# Patient Record
Sex: FEMALE | Race: BLACK OR AFRICAN AMERICAN | URBAN - METROPOLITAN AREA
[De-identification: names, ages, dates, MRNs, and addresses within clinical notes are randomized per-mention and may not be internally consistent; named-entity substitution may affect disease eponyms.]

---

## 2017-05-10 ENCOUNTER — OFFICE (OUTPATIENT)
Dept: URBAN - METROPOLITAN AREA CLINIC 78 | Facility: CLINIC | Age: 52
End: 2017-05-10

## 2017-05-10 VITALS
WEIGHT: 208 LBS | TEMPERATURE: 98 F | SYSTOLIC BLOOD PRESSURE: 107 MMHG | HEART RATE: 78 BPM | DIASTOLIC BLOOD PRESSURE: 64 MMHG | HEIGHT: 66 IN

## 2017-05-10 DIAGNOSIS — R93.5 ABNORMAL FINDINGS ON DIAGNOSTIC IMAGING OF OTHER ABDOMINAL R: ICD-10-CM

## 2017-05-10 DIAGNOSIS — K92.2 GASTROINTESTINAL HEMORRHAGE, UNSPECIFIED: ICD-10-CM

## 2017-05-10 DIAGNOSIS — R19.7 DIARRHEA, UNSPECIFIED: ICD-10-CM

## 2017-05-10 PROCEDURE — 99244 OFF/OP CNSLTJ NEW/EST MOD 40: CPT

## 2017-05-10 RX ORDER — CIPROFLOXACIN 500 MG/1
TABLET, FILM COATED ORAL
Qty: 28 | Refills: 0 | Status: COMPLETED
Start: 2017-05-10 | End: 2017-07-17

## 2017-05-10 RX ORDER — METRONIDAZOLE 500 MG/1
TABLET, FILM COATED ORAL
Qty: 42 | Refills: 0 | Status: COMPLETED
Start: 2017-05-10 | End: 2017-07-17

## 2017-05-10 RX ORDER — AMOXICILLIN AND CLAVULANATE POTASSIUM 500; 125 MG/1; MG/1
TABLET, FILM COATED ORAL
Qty: 21 | Status: COMPLETED
End: 2017-05-10

## 2017-07-17 ENCOUNTER — OFFICE (OUTPATIENT)
Dept: URBAN - METROPOLITAN AREA CLINIC 32 | Facility: CLINIC | Age: 52
End: 2017-07-17
Payer: COMMERCIAL

## 2017-07-17 ENCOUNTER — INDEPENDENT LABORATORY (OUTPATIENT)
Dept: URBAN - METROPOLITAN AREA PATHOLOGY 17 | Facility: PATHOLOGY | Age: 52
End: 2017-07-17
Payer: COMMERCIAL

## 2017-07-17 VITALS
RESPIRATION RATE: 14 BRPM | DIASTOLIC BLOOD PRESSURE: 54 MMHG | HEART RATE: 67 BPM | OXYGEN SATURATION: 94 % | OXYGEN SATURATION: 99 % | HEIGHT: 66 IN | WEIGHT: 208 LBS | SYSTOLIC BLOOD PRESSURE: 109 MMHG | HEART RATE: 57 BPM | TEMPERATURE: 97.9 F | SYSTOLIC BLOOD PRESSURE: 100 MMHG | DIASTOLIC BLOOD PRESSURE: 56 MMHG | SYSTOLIC BLOOD PRESSURE: 92 MMHG | DIASTOLIC BLOOD PRESSURE: 62 MMHG | TEMPERATURE: 97.7 F

## 2017-07-17 DIAGNOSIS — R19.7 DIARRHEA, UNSPECIFIED: ICD-10-CM

## 2017-07-17 DIAGNOSIS — R93.3 ABNORMAL FINDINGS ON DIAGNOSTIC IMAGING OF OTHER PARTS OF DI: ICD-10-CM

## 2017-07-17 DIAGNOSIS — K50.10 CROHN'S DISEASE OF LARGE INTESTINE WITHOUT COMPLICATIONS: ICD-10-CM

## 2017-07-17 PROBLEM — K52.9 INFLAMMATORY BOWEL DISEASE OF THE INTESTINE IF MORE PRECISE DIAGNOSIS OR DETERMINATION OF THE EXTENT/SEVERITY OF ACTIVITY OF DISEASE WILL INFLUENCE IMMEDIATE/FUTURE MANAGEMENT: Status: ACTIVE | Noted: 2017-07-17

## 2017-07-17 PROBLEM — K64.8 OTHER HEMORRHOIDS: Status: ACTIVE | Noted: 2017-07-17

## 2017-07-17 PROCEDURE — 00810: CPT | Mod: QS,AA

## 2017-07-17 PROCEDURE — 45380 COLONOSCOPY AND BIOPSY: CPT

## 2017-07-17 PROCEDURE — 00810: CPT | Mod: AA,QS

## 2017-07-17 PROCEDURE — 88305 TISSUE EXAM BY PATHOLOGIST: CPT

## 2017-08-16 ENCOUNTER — OFFICE (OUTPATIENT)
Dept: URBAN - METROPOLITAN AREA CLINIC 78 | Facility: CLINIC | Age: 52
End: 2017-08-16

## 2017-08-16 VITALS
HEIGHT: 66 IN | WEIGHT: 202 LBS | DIASTOLIC BLOOD PRESSURE: 72 MMHG | SYSTOLIC BLOOD PRESSURE: 124 MMHG | HEART RATE: 81 BPM | TEMPERATURE: 98.1 F

## 2017-08-16 DIAGNOSIS — K51.30 ULCERATIVE (CHRONIC) RECTOSIGMOIDITIS WITHOUT COMPLICATIONS: ICD-10-CM

## 2017-08-16 PROCEDURE — 99213 OFFICE O/P EST LOW 20 MIN: CPT

## 2022-04-04 ENCOUNTER — OFFICE (OUTPATIENT)
Dept: URBAN - METROPOLITAN AREA CLINIC 79 | Facility: CLINIC | Age: 57
End: 2022-04-04

## 2022-04-04 VITALS
HEIGHT: 66 IN | WEIGHT: 218 LBS | HEART RATE: 60 BPM | SYSTOLIC BLOOD PRESSURE: 97 MMHG | DIASTOLIC BLOOD PRESSURE: 61 MMHG | TEMPERATURE: 97.9 F

## 2022-04-04 DIAGNOSIS — K92.1 MELENA: ICD-10-CM

## 2022-04-04 DIAGNOSIS — R10.30 LOWER ABDOMINAL PAIN, UNSPECIFIED: ICD-10-CM

## 2022-04-04 DIAGNOSIS — K51.30 ULCERATIVE (CHRONIC) RECTOSIGMOIDITIS WITHOUT COMPLICATIONS: ICD-10-CM

## 2022-04-04 DIAGNOSIS — R15.2 FECAL URGENCY: ICD-10-CM

## 2022-04-04 PROCEDURE — 99205 OFFICE O/P NEW HI 60 MIN: CPT | Performed by: PHYSICIAN ASSISTANT

## 2022-04-04 RX ORDER — MESALAMINE 800 MG/1
TABLET, DELAYED RELEASE ORAL
Qty: 180 | Refills: 3 | Status: COMPLETED
Start: 2022-04-04 | End: 2022-06-07

## 2022-04-04 NOTE — SERVICEHPINOTES
RONNIE DUARTE   is a   57   year old    female with h/o UC who is being seen in consultation at the request of   JENNIFER STARK   for ER visit "colitis." She informs Asacol has run out 2-3 years ago and it was stopped due to "feeling well" at that time.  She informs of 3-4 ER visit over past 6 months due to rectal bleeding, abdominal pain, and diarrhea. Last [facility] encounter on 04/01/2022 led to lab work noting leukocytosis given WBC 14 H, no anemia (hgb 12/hct 38), PLTS 400, CMP overall normal, lipase 69, CRP 83. Prior CT scans from 03/2022 [facility] noting no acute process so most recent ER visit deferred repeat CT scan and treated symptomatically with empiric abx Cipro-Flagyl and started on course of prednisone as prior abx a few weeks back did not provide resolution of her symptoms. Last colonoscopy in noted evidence consistent w/ quiescent UC and colonic mucosa showing chronic architectural changes and mild plasmacytosis neg for dysplasia. She has BMs 9-12 x/day, BSS type 5-7 with bloody stools: Fecal urgency. Denies fevers, joint pains, upper abdominal pain, constipation, melena, weight loss. bee gamboa
Last seen by our office in 2017 (see chart) bee gamboa

## 2022-04-12 ENCOUNTER — INPATIENT HOSPITAL (OUTPATIENT)
Dept: URBAN - METROPOLITAN AREA HOSPITAL 62 | Facility: HOSPITAL | Age: 57
End: 2022-04-12
Payer: COMMERCIAL

## 2022-04-12 DIAGNOSIS — K56.0 PARALYTIC ILEUS: ICD-10-CM

## 2022-04-12 DIAGNOSIS — R10.30 LOWER ABDOMINAL PAIN, UNSPECIFIED: ICD-10-CM

## 2022-04-12 DIAGNOSIS — K50.818 CROHN'S DISEASE OF BOTH SMALL AND LARGE INTESTINE WITH OTHER: ICD-10-CM

## 2022-04-12 PROCEDURE — 99222 1ST HOSP IP/OBS MODERATE 55: CPT | Performed by: INTERNAL MEDICINE

## 2022-04-12 PROCEDURE — 99233 SBSQ HOSP IP/OBS HIGH 50: CPT | Performed by: INTERNAL MEDICINE

## 2022-04-13 PROCEDURE — 99232 SBSQ HOSP IP/OBS MODERATE 35: CPT | Performed by: INTERNAL MEDICINE

## 2022-04-14 PROCEDURE — 99232 SBSQ HOSP IP/OBS MODERATE 35: CPT | Performed by: INTERNAL MEDICINE

## 2022-04-15 PROCEDURE — 99232 SBSQ HOSP IP/OBS MODERATE 35: CPT | Performed by: INTERNAL MEDICINE

## 2022-04-16 PROCEDURE — 99231 SBSQ HOSP IP/OBS SF/LOW 25: CPT | Performed by: INTERNAL MEDICINE

## 2022-04-17 PROCEDURE — 99231 SBSQ HOSP IP/OBS SF/LOW 25: CPT | Performed by: INTERNAL MEDICINE

## 2022-04-18 ENCOUNTER — INPATIENT HOSPITAL (OUTPATIENT)
Dept: URBAN - METROPOLITAN AREA HOSPITAL 62 | Facility: HOSPITAL | Age: 57
End: 2022-04-18

## 2022-04-18 DIAGNOSIS — R10.30 LOWER ABDOMINAL PAIN, UNSPECIFIED: ICD-10-CM

## 2022-04-18 DIAGNOSIS — R19.7 DIARRHEA, UNSPECIFIED: ICD-10-CM

## 2022-04-18 DIAGNOSIS — K92.2 GASTROINTESTINAL HEMORRHAGE, UNSPECIFIED: ICD-10-CM

## 2022-04-18 DIAGNOSIS — K51.80 OTHER ULCERATIVE COLITIS WITHOUT COMPLICATIONS: ICD-10-CM

## 2022-04-18 DIAGNOSIS — D62 ACUTE POSTHEMORRHAGIC ANEMIA: ICD-10-CM

## 2022-04-18 PROCEDURE — 99232 SBSQ HOSP IP/OBS MODERATE 35: CPT | Performed by: NURSE PRACTITIONER

## 2022-04-19 PROCEDURE — 99232 SBSQ HOSP IP/OBS MODERATE 35: CPT | Performed by: NURSE PRACTITIONER

## 2022-06-01 ENCOUNTER — AMBULATORY SURGICAL CENTER (OUTPATIENT)
Dept: URBAN - METROPOLITAN AREA SURGERY 23 | Facility: SURGERY | Age: 57
End: 2022-06-01
Payer: COMMERCIAL

## 2022-06-01 DIAGNOSIS — K51.90 ULCERATIVE COLITIS, UNSPECIFIED, WITHOUT COMPLICATIONS: ICD-10-CM

## 2022-06-01 DIAGNOSIS — K51.40 INFLAMMATORY POLYPS OF COLON WITHOUT COMPLICATIONS: ICD-10-CM

## 2022-06-01 PROCEDURE — 45380 COLONOSCOPY AND BIOPSY: CPT | Performed by: INTERNAL MEDICINE

## 2022-06-07 ENCOUNTER — OFFICE (OUTPATIENT)
Dept: URBAN - METROPOLITAN AREA CLINIC 102 | Facility: CLINIC | Age: 57
End: 2022-06-07

## 2022-06-07 VITALS
SYSTOLIC BLOOD PRESSURE: 123 MMHG | TEMPERATURE: 97.3 F | HEIGHT: 66 IN | DIASTOLIC BLOOD PRESSURE: 69 MMHG | HEART RATE: 71 BPM | WEIGHT: 228 LBS

## 2022-06-07 DIAGNOSIS — K51.90 ULCERATIVE COLITIS, UNSPECIFIED, WITHOUT COMPLICATIONS: ICD-10-CM

## 2022-06-07 DIAGNOSIS — M25.50 PAIN IN UNSPECIFIED JOINT: ICD-10-CM

## 2022-06-07 PROCEDURE — 99215 OFFICE O/P EST HI 40 MIN: CPT | Performed by: INTERNAL MEDICINE

## 2022-06-07 NOTE — SERVICEHPINOTES
RONNIE DUARTE   is a   57  female who presents for follow up of ulcerative colitis. She was in the hospital in April with colitis flare in addition to C diff. She was treated for C diff and discharged on pred taper. She was improving until she stopped her prednisone prior to colonoscopy. She then started to note cramping and worsening of diarrhea. She had sigmoidoscopy on 6/1 which revealed active colitis. She the restarted her prednisone at 20 mg but was having discomfort and diarrhea so she increase to 30 mg of prednisone yesterday. She is currently on 30 mg of prednisone daily and that has seemed to quiet symptoms. She is down to 3-4 BMs a day, bleeding has improved but still present. She denies any other new complaints. She has a long history of colitis, believed to be UC, managed on mesalamine. She states she has been relatively asymptomatic for a number of years up until a couple of months ago. She had been seen in ED and had CT which revealed colitis so she was given antibiotics and discharged. She then had worsening symptoms so she ended up being admitted (diagnosed with the C diff at that time). She denies any skin rashes or oral ulcers. She does note some joint aches that have improved with prednisone.

## 2022-06-08 LAB
HCV ANTIBODY: HEP C VIRUS AB: <0.1 S/CO RATIO
Lab: (no result) TITER
Lab: (no result) TITER
Lab: <3.5 U/ML
Lab: <9 U/ML
Lab: HIGH TITER
SACCHAROMYCES CEREVISIAE PANEL: SACCHAROMYCES CEREVISIAE, IGA: <20 UNITS
SACCHAROMYCES CEREVISIAE PANEL: SACCHAROMYCES CEREVISIAE, IGG: 30.3 UNITS — HIGH (ref 0–24.9)

## 2022-06-10 LAB — CALPROTECTIN, FECAL: 2327 UG/G — HIGH (ref 0–120)

## 2022-06-13 ENCOUNTER — INPATIENT HOSPITAL (OUTPATIENT)
Dept: URBAN - METROPOLITAN AREA HOSPITAL 62 | Facility: HOSPITAL | Age: 57
End: 2022-06-13
Payer: COMMERCIAL

## 2022-06-13 DIAGNOSIS — K92.2 GASTROINTESTINAL HEMORRHAGE, UNSPECIFIED: ICD-10-CM

## 2022-06-13 DIAGNOSIS — R19.7 DIARRHEA, UNSPECIFIED: ICD-10-CM

## 2022-06-13 DIAGNOSIS — K51.818 OTHER ULCERATIVE COLITIS WITH OTHER COMPLICATION: ICD-10-CM

## 2022-06-13 DIAGNOSIS — R10.30 LOWER ABDOMINAL PAIN, UNSPECIFIED: ICD-10-CM

## 2022-06-13 PROCEDURE — 99222 1ST HOSP IP/OBS MODERATE 55: CPT | Performed by: INTERNAL MEDICINE

## 2022-06-14 ENCOUNTER — INPATIENT HOSPITAL (OUTPATIENT)
Dept: URBAN - METROPOLITAN AREA HOSPITAL 62 | Facility: HOSPITAL | Age: 57
End: 2022-06-14
Payer: COMMERCIAL

## 2022-06-14 DIAGNOSIS — K92.2 GASTROINTESTINAL HEMORRHAGE, UNSPECIFIED: ICD-10-CM

## 2022-06-14 DIAGNOSIS — K51.818 OTHER ULCERATIVE COLITIS WITH OTHER COMPLICATION: ICD-10-CM

## 2022-06-14 DIAGNOSIS — R19.7 DIARRHEA, UNSPECIFIED: ICD-10-CM

## 2022-06-14 DIAGNOSIS — R10.30 LOWER ABDOMINAL PAIN, UNSPECIFIED: ICD-10-CM

## 2022-06-14 PROCEDURE — 99232 SBSQ HOSP IP/OBS MODERATE 35: CPT | Performed by: NURSE PRACTITIONER

## 2022-06-15 PROCEDURE — 99232 SBSQ HOSP IP/OBS MODERATE 35: CPT | Performed by: NURSE PRACTITIONER

## 2022-06-16 PROCEDURE — 99232 SBSQ HOSP IP/OBS MODERATE 35: CPT | Performed by: NURSE PRACTITIONER

## 2022-06-17 ENCOUNTER — INPATIENT HOSPITAL (OUTPATIENT)
Dept: URBAN - METROPOLITAN AREA HOSPITAL 62 | Facility: HOSPITAL | Age: 57
End: 2022-06-17
Payer: COMMERCIAL

## 2022-06-17 DIAGNOSIS — K51.818 OTHER ULCERATIVE COLITIS WITH OTHER COMPLICATION: ICD-10-CM

## 2022-06-17 DIAGNOSIS — R10.30 LOWER ABDOMINAL PAIN, UNSPECIFIED: ICD-10-CM

## 2022-06-17 DIAGNOSIS — K92.2 GASTROINTESTINAL HEMORRHAGE, UNSPECIFIED: ICD-10-CM

## 2022-06-17 DIAGNOSIS — R19.7 DIARRHEA, UNSPECIFIED: ICD-10-CM

## 2022-06-17 PROCEDURE — 99232 SBSQ HOSP IP/OBS MODERATE 35: CPT | Performed by: NURSE PRACTITIONER

## 2022-06-18 PROCEDURE — 99232 SBSQ HOSP IP/OBS MODERATE 35: CPT | Performed by: INTERNAL MEDICINE

## 2022-08-04 ENCOUNTER — OFFICE (OUTPATIENT)
Dept: URBAN - METROPOLITAN AREA CLINIC 79 | Facility: CLINIC | Age: 57
End: 2022-08-04
Payer: COMMERCIAL

## 2022-08-04 VITALS
HEIGHT: 66 IN | WEIGHT: 228 LBS | DIASTOLIC BLOOD PRESSURE: 76 MMHG | TEMPERATURE: 97.2 F | SYSTOLIC BLOOD PRESSURE: 116 MMHG | HEART RATE: 104 BPM

## 2022-08-04 DIAGNOSIS — K51.30 ULCERATIVE (CHRONIC) RECTOSIGMOIDITIS WITHOUT COMPLICATIONS: ICD-10-CM

## 2022-08-04 DIAGNOSIS — A04.71 ENTEROCOLITIS DUE TO CLOSTRIDIUM DIFFICILE, RECURRENT: ICD-10-CM

## 2022-08-04 DIAGNOSIS — K62.5 HEMORRHAGE OF ANUS AND RECTUM: ICD-10-CM

## 2022-08-04 DIAGNOSIS — R93.5 ABNORMAL FINDINGS ON DIAGNOSTIC IMAGING OF OTHER ABDOMINAL R: ICD-10-CM

## 2022-08-04 DIAGNOSIS — K59.1 FUNCTIONAL DIARRHEA: ICD-10-CM

## 2022-08-04 DIAGNOSIS — K86.89 OTHER SPECIFIED DISEASES OF PANCREAS: ICD-10-CM

## 2022-08-04 DIAGNOSIS — R19.5 OTHER FECAL ABNORMALITIES: ICD-10-CM

## 2022-08-04 PROCEDURE — 99215 OFFICE O/P EST HI 40 MIN: CPT | Performed by: PHYSICIAN ASSISTANT

## 2022-08-04 NOTE — SERVICEHPINOTES
lavelle style="background-color: rgb(255, 255, 0)" visited="true"Ms. Sanchez is here for f/u. She was recently in the ER and notes state that a "CT scan showed prominence of the pancreatic duct since 2016 which is worse, more conspicuous than previous exams. The head of the pancreas looks unusual too--this present since at least 2016". We have been trying to reach her regarding this. She has pain in the R lower back. Pain has been present since April 2020. Pain is quite low with a palp knot. She hasn't seen orthopedics for the pain. Her PCP has her on muscle relaxers which make her sleep but don't help the pain. As pain is ongoing, I have asked her to schedule a f/u to address this with her PCP. The pt denies UGI pain, nausea, vomiting. Pt has been seeing Dr. Valle for UC.  /lavelle  [ROS Wording]   She was in the hospital in April with colitis flare in addition to C diff. She was treated for C diff and discharged on pred taper. She was improving until she stopped her prednisone prior to colonoscopy. She then started to note cramping and worsening of diarrhea. She had sigmoidoscopy on 6/1 which revealed active colitis. She has a long history of colitis, believed to be UC, managed on mesalamine. She states she has been relatively asymptomatic for a number of years up until a couple of months ago. She had been seen in ED and had CT which revealed colitis so she was given antibiotics and discharged. She then had worsening symptoms so she ended up being admitted (diagnosed with the C diff at that time). Dr. Valle saw her on 6/7/22 and ordered repeat c diff x 3 and the lab did not complete this. Instead, they ordered a fecal calprotectin x 3 which showed an inflammation level of 2700. Normal LFTs and lipase. br
brRecent colonoscopy 6/1/22 showed continuous inflammation from the rectum to the sigmoid colon. Bxs showed active inflammation. 
br
br
She is currently on 15 mg of daily prednisone. Symptoms are much improved. Sees blood per rectum only on occasion. No painful eyes. She has about 5-6 BMs per day. Stools aren't completely formed but not watery. No fever. + night sweats. Mild intermittent abdominal pain--worse when she needs a BM and improved by a BM. No significant RLQ pain. Has mild pain in the LLQ. No other GI related complaints today.

## 2022-11-04 ENCOUNTER — INPATIENT HOSPITAL (OUTPATIENT)
Dept: URBAN - METROPOLITAN AREA HOSPITAL 62 | Facility: HOSPITAL | Age: 57
End: 2022-11-04
Payer: MEDICAID

## 2022-11-04 DIAGNOSIS — K51.911 ULCERATIVE COLITIS, UNSPECIFIED WITH RECTAL BLEEDING: ICD-10-CM

## 2022-11-04 DIAGNOSIS — K92.1 MELENA: ICD-10-CM

## 2022-11-04 DIAGNOSIS — R10.30 LOWER ABDOMINAL PAIN, UNSPECIFIED: ICD-10-CM

## 2022-11-04 DIAGNOSIS — K92.2 GASTROINTESTINAL HEMORRHAGE, UNSPECIFIED: ICD-10-CM

## 2022-11-04 PROCEDURE — 99233 SBSQ HOSP IP/OBS HIGH 50: CPT | Performed by: INTERNAL MEDICINE

## 2022-11-05 PROCEDURE — 99232 SBSQ HOSP IP/OBS MODERATE 35: CPT | Performed by: INTERNAL MEDICINE

## 2022-11-06 PROCEDURE — 99232 SBSQ HOSP IP/OBS MODERATE 35: CPT | Performed by: INTERNAL MEDICINE

## 2022-11-07 ENCOUNTER — INPATIENT HOSPITAL (OUTPATIENT)
Dept: URBAN - METROPOLITAN AREA HOSPITAL 62 | Facility: HOSPITAL | Age: 57
End: 2022-11-07
Payer: MEDICAID

## 2022-11-07 DIAGNOSIS — K51.911 ULCERATIVE COLITIS, UNSPECIFIED WITH RECTAL BLEEDING: ICD-10-CM

## 2022-11-07 DIAGNOSIS — K92.1 MELENA: ICD-10-CM

## 2022-11-07 DIAGNOSIS — K52.9 NONINFECTIVE GASTROENTERITIS AND COLITIS, UNSPECIFIED: ICD-10-CM

## 2022-11-07 DIAGNOSIS — R10.13 EPIGASTRIC PAIN: ICD-10-CM

## 2022-11-07 DIAGNOSIS — K92.2 GASTROINTESTINAL HEMORRHAGE, UNSPECIFIED: ICD-10-CM

## 2022-11-07 PROCEDURE — 99232 SBSQ HOSP IP/OBS MODERATE 35: CPT | Performed by: NURSE PRACTITIONER

## 2022-11-08 ENCOUNTER — INPATIENT HOSPITAL (OUTPATIENT)
Dept: URBAN - METROPOLITAN AREA HOSPITAL 62 | Facility: HOSPITAL | Age: 57
End: 2022-11-08
Payer: MEDICAID

## 2022-11-08 DIAGNOSIS — R10.13 EPIGASTRIC PAIN: ICD-10-CM

## 2022-11-08 DIAGNOSIS — K92.2 GASTROINTESTINAL HEMORRHAGE, UNSPECIFIED: ICD-10-CM

## 2022-11-08 DIAGNOSIS — K52.9 NONINFECTIVE GASTROENTERITIS AND COLITIS, UNSPECIFIED: ICD-10-CM

## 2022-11-08 DIAGNOSIS — K51.90 ULCERATIVE COLITIS, UNSPECIFIED, WITHOUT COMPLICATIONS: ICD-10-CM

## 2022-11-08 DIAGNOSIS — K92.1 MELENA: ICD-10-CM

## 2022-11-08 PROCEDURE — 99232 SBSQ HOSP IP/OBS MODERATE 35: CPT | Performed by: NURSE PRACTITIONER
